# Patient Record
(demographics unavailable — no encounter records)

---

## 2024-11-26 NOTE — CARDIOLOGY SUMMARY
[de-identified] : Sinus Rhythm    [de-identified] : 1/2024  pharm small-sized, moderate defect(s) in the basal inferior and mid inferior walls that are fixed, partially normalizes with prone imaging suggestive of diaphragmatic attenuation artifact  [de-identified] : 1/2024 Left ventricular cavity is normal. Left ventricular wall thickness is normal. Left ventricular systolic function is normal with an ejection fraction of 67 % by Pepe's method of disks. mild Ao 4.0  [de-identified] : 2024 Ca score 4

## 2024-11-26 NOTE — HISTORY OF PRESENT ILLNESS
Refill request received for Albuterol HFA (Proair) inhaler. Last OV: 9/30/19. Next OV: 3/20/20. Script e-scribed.    
[FreeTextEntry1] : 63 year old man with a history of HTN, arthritis, RBBB present for cardiac evaluation.   Since his last visit, he had a Ca score of 4. He did not want to take the statin.  He   denies any chest pain, PND, orthopnea, lower extremity edema, near syncope, syncope, stroke like symptoms.  [place]  He is not checking his BPs at home. He is compliant with his medications. Medication reconciliation performed.

## 2024-11-26 NOTE — DISCUSSION/SUMMARY
[FreeTextEntry1] : 63 year man with a history as listed presents for a followup evaluation.  Brain is much better. His BP has improved.  He will continue Olmesartan HCTZ Amlodipine 40-25-10mg Qday. He will continue  Bystolic 10mg Qday.  His EKG is unchanged from previous. He has a baseline RBBB. his most recent workup has been unremarkable.  He has positive Ca score. Now willing to take crestor. repeat CMp and Lipids Exercise and diet counseling was performed in order to reduce her future cardiovascular risk.  He will followup with me in 6 months  or sooner if necessary.  [EKG obtained to assist in diagnosis and management of assessed problem(s)] : EKG obtained to assist in diagnosis and management of assessed problem(s)

## 2025-05-28 NOTE — HISTORY OF PRESENT ILLNESS
[FreeTextEntry1] : 63 year old man with a history of HTN, arthritis, RBBB present for cardiac evaluation.   Since his last visit,  he has been still having issues with left knee. He has been dealing with right knee pain. He   denies any chest pain, PND, orthopnea, lower extremity edema, near syncope, syncope, stroke like symptoms.  [place]  He is not checking his BPs at home. He is compliant with his medications. Medication reconciliation performed.

## 2025-05-28 NOTE — DISCUSSION/SUMMARY
[FreeTextEntry1] : 64 year man with a history as listed presents for a followup evaluation.  Brain is much better. His BP has improved.  He will continue Olmesartan HCTZ Amlodipine 40-25-10mg Qday. He will continue  Bystolic 10mg Qday.  His EKG is unchanged from previous. He has a baseline RBBB. his most recent workup has been unremarkable.  He will get a 2d echo to assess for any  new structural heart disease, changes in valvular and ventricular function, and to reassess enlarged ao.  He has positive Ca score. He will continue with statin therapy to achieve maintain goal LDL<100,  Exercise and diet counseling was performed in order to reduce her future cardiovascular risk. He will need to consider a GLP1.  He will followup with me in 6 months  or sooner if necessary.  [EKG obtained to assist in diagnosis and management of assessed problem(s)] : EKG obtained to assist in diagnosis and management of assessed problem(s)

## 2025-05-28 NOTE — CARDIOLOGY SUMMARY
[de-identified] : Sinus Rhythm  -Right bundle branch block. [de-identified] : 1/2024  pharm small-sized, moderate defect(s) in the basal inferior and mid inferior walls that are fixed, partially normalizes with prone imaging suggestive of diaphragmatic attenuation artifact  [de-identified] : 1/2024 Left ventricular cavity is normal. Left ventricular wall thickness is normal. Left ventricular systolic function is normal with an ejection fraction of 67 % by Pepe's method of disks. mild Ao 4.0  [de-identified] : 2024 Ca score 4